# Patient Record
Sex: FEMALE | Race: WHITE | NOT HISPANIC OR LATINO | URBAN - METROPOLITAN AREA
[De-identification: names, ages, dates, MRNs, and addresses within clinical notes are randomized per-mention and may not be internally consistent; named-entity substitution may affect disease eponyms.]

---

## 2017-08-28 ENCOUNTER — IMPORTED ENCOUNTER (OUTPATIENT)
Dept: URBAN - METROPOLITAN AREA CLINIC 50 | Facility: CLINIC | Age: 72
End: 2017-08-28

## 2017-08-29 ENCOUNTER — IMPORTED ENCOUNTER (OUTPATIENT)
Dept: URBAN - METROPOLITAN AREA CLINIC 50 | Facility: CLINIC | Age: 72
End: 2017-08-29

## 2018-09-05 ENCOUNTER — IMPORTED ENCOUNTER (OUTPATIENT)
Dept: URBAN - METROPOLITAN AREA CLINIC 50 | Facility: CLINIC | Age: 73
End: 2018-09-05

## 2019-09-25 ENCOUNTER — IMPORTED ENCOUNTER (OUTPATIENT)
Dept: URBAN - METROPOLITAN AREA CLINIC 50 | Facility: CLINIC | Age: 74
End: 2019-09-25

## 2020-06-19 ENCOUNTER — IMPORTED ENCOUNTER (OUTPATIENT)
Dept: URBAN - METROPOLITAN AREA CLINIC 50 | Facility: CLINIC | Age: 75
End: 2020-06-19

## 2020-06-25 ENCOUNTER — IMPORTED ENCOUNTER (OUTPATIENT)
Dept: URBAN - METROPOLITAN AREA CLINIC 50 | Facility: CLINIC | Age: 75
End: 2020-06-25

## 2020-06-30 ENCOUNTER — IMPORTED ENCOUNTER (OUTPATIENT)
Dept: URBAN - METROPOLITAN AREA CLINIC 50 | Facility: CLINIC | Age: 75
End: 2020-06-30

## 2020-07-10 ENCOUNTER — IMPORTED ENCOUNTER (OUTPATIENT)
Dept: URBAN - METROPOLITAN AREA CLINIC 50 | Facility: CLINIC | Age: 75
End: 2020-07-10

## 2020-07-16 ENCOUNTER — IMPORTED ENCOUNTER (OUTPATIENT)
Dept: URBAN - METROPOLITAN AREA CLINIC 50 | Facility: CLINIC | Age: 75
End: 2020-07-16

## 2020-07-16 NOTE — PATIENT DISCUSSION
"""S/P IOL OD: Sensar AAB00 22.0 (Target: [place]) +Omidria.  Continue post operative instructions ""

## 2020-07-22 ENCOUNTER — IMPORTED ENCOUNTER (OUTPATIENT)
Dept: URBAN - METROPOLITAN AREA CLINIC 50 | Facility: CLINIC | Age: 75
End: 2020-07-22

## 2020-07-22 NOTE — PATIENT DISCUSSION
"""S/P IOL OD: Sensar AAB00 22.0 (Target: [place]) +Omidria. Continue post operative instructions and drops per schedule.  """

## 2020-07-30 ENCOUNTER — IMPORTED ENCOUNTER (OUTPATIENT)
Dept: URBAN - METROPOLITAN AREA CLINIC 50 | Facility: CLINIC | Age: 75
End: 2020-07-30

## 2020-07-30 NOTE — PATIENT DISCUSSION
"""S/P IOL OS: Sensar AAB00 22.0 (Target: [place]) +Omidria. Continue post operative instructions and drops per schedule.  """

## 2020-08-07 ENCOUNTER — IMPORTED ENCOUNTER (OUTPATIENT)
Dept: URBAN - METROPOLITAN AREA CLINIC 50 | Facility: CLINIC | Age: 75
End: 2020-08-07

## 2020-08-07 NOTE — PATIENT DISCUSSION
"""S/P IOL OS: Sensar AAB00 22.0 (Target: [place]) +Omidria. Continue post operative instructions and drops per schedule.  """

## 2020-09-08 ENCOUNTER — IMPORTED ENCOUNTER (OUTPATIENT)
Dept: URBAN - METROPOLITAN AREA CLINIC 50 | Facility: CLINIC | Age: 75
End: 2020-09-08

## 2020-09-08 NOTE — PATIENT DISCUSSION
"""S/P IOL OU: OD: Sensar AAB00 22.0 (Target: [place])Omidria. OS: Sensar AAB00 22.0 (Target: [place]). "

## 2020-12-23 ENCOUNTER — IMPORTED ENCOUNTER (OUTPATIENT)
Dept: URBAN - METROPOLITAN AREA CLINIC 50 | Facility: CLINIC | Age: 75
End: 2020-12-23

## 2020-12-23 NOTE — PATIENT DISCUSSION
"""S/P IOL OU: OD: Sensar AAB00 22.0 (Target: [place])Omidria. OS: Sensar AAB00 22.0 (Target: [place]). "

## 2021-05-14 ASSESSMENT — TONOMETRY
OS_IOP_MMHG: 16
OS_IOP_MMHG: 16
OD_IOP_MMHG: 16
OS_IOP_MMHG: 15
OS_IOP_MMHG: 16
OD_IOP_MMHG: 15
OS_IOP_MMHG: 17
OS_IOP_MMHG: 15
OD_IOP_MMHG: 16
OS_IOP_MMHG: 16
OD_IOP_MMHG: 17
OS_IOP_MMHG: 16
OD_IOP_MMHG: 16
OD_IOP_MMHG: 16
OS_IOP_MMHG: 21
OD_IOP_MMHG: 16
OS_IOP_MMHG: 16
OD_IOP_MMHG: 19
OD_IOP_MMHG: 15
OD_IOP_MMHG: 16

## 2021-05-14 ASSESSMENT — VISUAL ACUITY
OD_SC: 20/40
OD_CC: J1+
OS_CC: J1
OD_BAT: 20/80
OS_CC: J1+
OS_CC: J3
OS_OTHER: 20/40. 20/40.
OS_SC: 20/30-
OS_SC: 20/20
OD_CC: J3
OS_BAT: 20/70
OS_BAT: 20/40
OS_SC: 20/20
OS_CC: J1+
OD_OTHER: 20/30. 20/40.
OD_CC: J1@ 13 IN
OS_BAT: 20/30
OS_CC: 20/25
OS_BAT: 20/30
OD_SC: 20/20
OS_CC: J1+
OD_BAT: 20/30
OD_CC: 20/20-2
OS_SC: 20/30-1
OD_CC: J1+
OS_CC: 20/20-
OD_CC: 20/25+-
OD_SC: 20/20
OD_SC: 20/20
OS_OTHER: 20/40. 20/40.
OD_BAT: 20/25
OS_CC: 20/25+
OS_OTHER: 20/40. 20/50.
OS_PH: 20/25
OS_CC: J5@ 13 IN
OS_SC: 20/20
OD_CC: J5@ 13 IN
OS_BAT: 20/40
OS_OTHER: 20/30. 20/40.
OD_CC: J1
OD_OTHER: 20/30. 20/40.
OS_CC: 20/20-2
OS_BAT: 20/40
OD_OTHER: 20/80. 20/100.
OS_CC: J1@ 13 IN
OD_CC: 20/20-
OD_SC: 20/30-
OD_CC: J1+
OD_PH: 20/30
OS_OTHER: 20/30. 20/30.
OS_CC: 20/40
OD_BAT: 20/30
OD_OTHER: 20/25. 20/25.
OD_OTHER: 20/30. 20/40.
OD_CC: 20/25
OD_SC: 20/20-1
OS_OTHER: 20/70. 20/80.
OD_BAT: 20/30

## 2021-07-08 ENCOUNTER — PREPPED CHART (OUTPATIENT)
Dept: URBAN - METROPOLITAN AREA CLINIC 53 | Facility: CLINIC | Age: 76
End: 2021-07-08

## 2021-07-08 NOTE — PATIENT DISCUSSION
"""S/P IOL OU: OD: Sensar AAB00 22.0 (Target: [place])Omidria. OS: Sensar AAB00 22.0 (Target: [place]). "

## 2021-07-28 ENCOUNTER — PROBLEM (OUTPATIENT)
Dept: URBAN - METROPOLITAN AREA CLINIC 53 | Facility: CLINIC | Age: 76
End: 2021-07-28

## 2021-07-28 DIAGNOSIS — H26.491: ICD-10-CM

## 2021-07-28 DIAGNOSIS — H04.123: ICD-10-CM

## 2021-07-28 DIAGNOSIS — H35.373: ICD-10-CM

## 2021-07-28 PROCEDURE — 92134 CPTRZ OPH DX IMG PST SGM RTA: CPT

## 2021-07-28 PROCEDURE — 92014 COMPRE OPH EXAM EST PT 1/>: CPT

## 2021-07-28 ASSESSMENT — VISUAL ACUITY
OS_SC: 20/20
OD_GLARE: 20/40
OS_GLARE: 20/25
OU_CC: J1+/-
OS_GLARE: 20/20
OD_SC: 20/25
OD_GLARE: 20/30

## 2021-07-28 ASSESSMENT — TONOMETRY
OS_IOP_MMHG: 20
OD_IOP_MMHG: 20

## 2021-07-28 NOTE — PATIENT DISCUSSION
PCO ([id number] [place] [id number]): Visually significant PCO present on exam today. Recommend YAG laser capsulotomy to improve vision and decrease glare symptoms. RBAs of procedure discussed. Patient agrees and wishes to proceed.

## 2021-07-28 NOTE — PATIENT DISCUSSION
Dry eye regiment discussed with patient. Start PF tears OU 3-4x/day. Start warm compresses OU BID. Start lid scrubs OU BID.

## 2021-08-18 ENCOUNTER — YAG CAPSULOTOMY OD (OUTPATIENT)
Dept: URBAN - METROPOLITAN AREA CLINIC 53 | Facility: CLINIC | Age: 76
End: 2021-08-18

## 2021-08-18 DIAGNOSIS — H26.491: ICD-10-CM

## 2021-08-18 PROCEDURE — 66821 AFTER CATARACT LASER SURGERY: CPT

## 2021-08-18 RX ORDER — PREDNISOLONE ACETATE 10 MG/ML: 1 SUSPENSION/ DROPS OPHTHALMIC TWICE A DAY

## 2021-08-18 ASSESSMENT — VISUAL ACUITY
OD_GLARE: 20/40
OS_GLARE: 20/30
OD_GLARE: 20/30
OS_SC: 20/20
OS_GLARE: 20/25
OD_SC: 20/25

## 2021-08-18 ASSESSMENT — TONOMETRY
OD_IOP_MMHG: 17
OS_IOP_MMHG: 16

## 2021-08-18 NOTE — PROCEDURE NOTE: CLINICAL
PROCEDURE NOTE: YAG Capsulotomy OD. Diagnosis: Posterior Capsular Opacification (PCO). Prior to treatment, the risks/benefits/alternatives were discussed. The patient wished to proceed with procedure. Consent was signed. Proparacaine and brominidine were placed into the operative eye after the eye was dilated. Power = 1.4mJ. Number of pulses = 26. Patient tolerated procedure well and there were no complications. Post Laser instructions given. Felix Pastor

## 2021-08-18 NOTE — PATIENT DISCUSSION
Yag Cap performed today with patient's signed consent. Patient to start Prednisolone Acetate OD BID x7 days, then decrease to QD x7 days, then discontinue drops.

## 2021-09-14 ENCOUNTER — YAG CAPSULOTOMY PO (OUTPATIENT)
Dept: URBAN - METROPOLITAN AREA CLINIC 49 | Facility: CLINIC | Age: 76
End: 2021-09-14

## 2021-09-14 DIAGNOSIS — Z98.890: ICD-10-CM

## 2021-09-14 DIAGNOSIS — H40.011: ICD-10-CM

## 2021-09-14 PROCEDURE — 92015NC REFRACTION NO CHARGE

## 2021-09-14 PROCEDURE — 92133 CPTRZD OPH DX IMG PST SGM ON: CPT

## 2021-09-14 PROCEDURE — 99024 POSTOP FOLLOW-UP VISIT: CPT

## 2021-09-14 ASSESSMENT — TONOMETRY
OS_IOP_MMHG: 18
OD_IOP_MMHG: 18

## 2021-09-14 ASSESSMENT — VISUAL ACUITY
OS_CC: 20/20
OU_CC: J1+
OD_CC: 20/20

## 2021-09-14 NOTE — PATIENT DISCUSSION
PCO ([place]): PCO is not visually significant. Educated patient on signs and symptoms of PCO. Continue to monitor at this time. No risk alerts present

## 2021-09-14 NOTE — PATIENT DISCUSSION
Per CD asymmetry OD>OS. IOP 18/18. (+)Fam hx (mother). OCT (RNFL) today wnl OD/OS. Will continue to monitor.

## 2023-01-16 ENCOUNTER — COMPREHENSIVE EXAM (OUTPATIENT)
Dept: URBAN - METROPOLITAN AREA CLINIC 48 | Facility: LOCATION | Age: 78
End: 2023-01-16

## 2023-01-16 DIAGNOSIS — H43.811: ICD-10-CM

## 2023-01-16 DIAGNOSIS — H34.211: ICD-10-CM

## 2023-01-16 DIAGNOSIS — H35.361: ICD-10-CM

## 2023-01-16 DIAGNOSIS — H35.373: ICD-10-CM

## 2023-01-16 DIAGNOSIS — H04.123: ICD-10-CM

## 2023-01-16 DIAGNOSIS — H40.013: ICD-10-CM

## 2023-01-16 DIAGNOSIS — H26.492: ICD-10-CM

## 2023-01-16 PROCEDURE — 92134 CPTRZ OPH DX IMG PST SGM RTA: CPT

## 2023-01-16 PROCEDURE — 92014 COMPRE OPH EXAM EST PT 1/>: CPT

## 2023-01-16 ASSESSMENT — KERATOMETRY
OD_K1POWER_DIOPTERS: 43.50
OS_K1POWER_DIOPTERS: 43.50
OD_K2POWER_DIOPTERS: 44.00
OD_AXISANGLE2_DEGREES: 40
OS_K2POWER_DIOPTERS: 44.25
OS_AXISANGLE_DEGREES: 10
OS_AXISANGLE2_DEGREES: 100
OD_AXISANGLE_DEGREES: 130

## 2023-01-16 ASSESSMENT — VISUAL ACUITY
OS_GLARE: 20/25-1
OD_PH: 20/20
OU_SC: J1@16
OS_SC: 20/30
OD_SC: 20/30-1
OS_GLARE: 20/50-1
OD_GLARE: 20/30-1
OD_GLARE: 20/80
OS_PH: 20/25

## 2023-01-16 ASSESSMENT — TONOMETRY
OD_IOP_MMHG: 16
OS_IOP_MMHG: 16

## 2023-01-16 NOTE — PATIENT DISCUSSION
PCO ([id number] [place] [address]): Visually significant PCO present on exam today. Recommend YAG laser capsulotomy to improve vision and decrease glare symptoms. RBAs of procedure discussed. Patient agrees and wishes to proceed.

## 2023-01-16 NOTE — PATIENT DISCUSSION
Advised patient to use preservative-free artificial tears (Systane Complete PF) QID OU, warm compresses BID OU, and lid scrubs BID OU.

## 2023-01-16 NOTE — PATIENT DISCUSSION
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 Patient asymptomatic. Followed by Cardiology. Reports having Carotid US many years ago. Takes Aspirin 81mg qd PO. Patient educated on signs/symptoms of TIA/CVA and to go to ER STAT if these ensue. Will order Carotid US and will call patient with results.

## 2023-05-09 ENCOUNTER — DIAGNOSTICS ONLY (OUTPATIENT)
Dept: URBAN - METROPOLITAN AREA CLINIC 48 | Facility: LOCATION | Age: 78
End: 2023-05-09

## 2023-05-09 DIAGNOSIS — H40.013: ICD-10-CM

## 2023-05-09 PROCEDURE — 92133 CPTRZD OPH DX IMG PST SGM ON: CPT

## 2023-05-09 PROCEDURE — 92083 EXTENDED VISUAL FIELD XM: CPT

## 2023-05-09 ASSESSMENT — KERATOMETRY
OD_K1POWER_DIOPTERS: 43.50
OS_AXISANGLE_DEGREES: 10
OD_K2POWER_DIOPTERS: 44.00
OS_K1POWER_DIOPTERS: 43.50
OS_K2POWER_DIOPTERS: 44.25
OS_AXISANGLE2_DEGREES: 100
OD_AXISANGLE_DEGREES: 130
OD_AXISANGLE2_DEGREES: 40

## 2024-06-04 ENCOUNTER — COMPREHENSIVE EXAM (OUTPATIENT)
Dept: URBAN - METROPOLITAN AREA CLINIC 48 | Facility: LOCATION | Age: 79
End: 2024-06-04

## 2024-06-04 DIAGNOSIS — H34.211: ICD-10-CM

## 2024-06-04 DIAGNOSIS — H43.812: ICD-10-CM

## 2024-06-04 DIAGNOSIS — H35.361: ICD-10-CM

## 2024-06-04 DIAGNOSIS — D18.00: ICD-10-CM

## 2024-06-04 DIAGNOSIS — H35.372: ICD-10-CM

## 2024-06-04 DIAGNOSIS — H40.013: ICD-10-CM

## 2024-06-04 PROCEDURE — 92134 CPTRZ OPH DX IMG PST SGM RTA: CPT

## 2024-06-04 PROCEDURE — 99214 OFFICE O/P EST MOD 30 MIN: CPT

## 2024-06-04 ASSESSMENT — VISUAL ACUITY
OD_SC: 20/20-1
OS_SC: 20/20-1
OU_CC: J1+@14"

## 2024-06-04 ASSESSMENT — KERATOMETRY
OS_AXISANGLE_DEGREES: 40
OD_K2POWER_DIOPTERS: 43.25
OS_K2POWER_DIOPTERS: 44.25
OD_K1POWER_DIOPTERS: 43.50
OS_K1POWER_DIOPTERS: 43.50
OS_AXISANGLE2_DEGREES: 130
OD_AXISANGLE2_DEGREES: 120
OD_AXISANGLE_DEGREES: 30

## 2024-06-04 ASSESSMENT — TONOMETRY
OS_IOP_MMHG: 14
OD_IOP_MMHG: 14

## 2024-07-31 ENCOUNTER — DIAGNOSTICS ONLY (OUTPATIENT)
Dept: URBAN - METROPOLITAN AREA CLINIC 48 | Facility: LOCATION | Age: 79
End: 2024-07-31

## 2024-07-31 DIAGNOSIS — H40.013: ICD-10-CM

## 2024-07-31 PROCEDURE — 92083 EXTENDED VISUAL FIELD XM: CPT

## 2024-07-31 PROCEDURE — 92133 CPTRZD OPH DX IMG PST SGM ON: CPT

## 2024-07-31 ASSESSMENT — KERATOMETRY
OS_K1POWER_DIOPTERS: 43.50
OD_K1POWER_DIOPTERS: 43.50
OD_AXISANGLE2_DEGREES: 120
OD_K2POWER_DIOPTERS: 43.25
OD_AXISANGLE_DEGREES: 30
OS_K2POWER_DIOPTERS: 44.25
OS_AXISANGLE_DEGREES: 40
OS_AXISANGLE2_DEGREES: 130

## 2024-08-08 ENCOUNTER — DIAGNOSTICS ONLY (OUTPATIENT)
Dept: URBAN - METROPOLITAN AREA CLINIC 53 | Facility: CLINIC | Age: 79
End: 2024-08-08

## 2024-08-08 DIAGNOSIS — H40.013: ICD-10-CM

## 2024-08-08 PROCEDURE — 92083 EXTENDED VISUAL FIELD XM: CPT | Mod: NC

## 2024-08-08 PROCEDURE — 92133 CPTRZD OPH DX IMG PST SGM ON: CPT | Mod: NC

## 2024-08-08 ASSESSMENT — KERATOMETRY
OD_K2POWER_DIOPTERS: 43.25
OS_AXISANGLE2_DEGREES: 130
OS_K2POWER_DIOPTERS: 44.25
OS_AXISANGLE_DEGREES: 40
OS_K1POWER_DIOPTERS: 43.50
OD_K1POWER_DIOPTERS: 43.50
OD_AXISANGLE2_DEGREES: 120
OD_AXISANGLE_DEGREES: 30